# Patient Record
Sex: FEMALE | Race: WHITE
[De-identification: names, ages, dates, MRNs, and addresses within clinical notes are randomized per-mention and may not be internally consistent; named-entity substitution may affect disease eponyms.]

---

## 2021-01-20 ENCOUNTER — HOSPITAL ENCOUNTER (EMERGENCY)
Dept: HOSPITAL 47 - EC | Age: 21
Discharge: HOME | End: 2021-01-20
Payer: COMMERCIAL

## 2021-01-20 VITALS — DIASTOLIC BLOOD PRESSURE: 79 MMHG | HEART RATE: 89 BPM | RESPIRATION RATE: 16 BRPM | SYSTOLIC BLOOD PRESSURE: 136 MMHG

## 2021-01-20 VITALS — TEMPERATURE: 98.4 F
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
## 2021-01-20 DIAGNOSIS — Z79.84: ICD-10-CM

## 2021-01-20 DIAGNOSIS — Z79.899: ICD-10-CM

## 2021-01-20 DIAGNOSIS — S93.601A: Primary | ICD-10-CM

## 2021-01-20 PROCEDURE — 99283 EMERGENCY DEPT VISIT LOW MDM: CPT

## 2021-01-20 NOTE — ED
Lower Extremity Injury HPI





- General


Chief Complaint: Extremity Injury, Lower


Stated Complaint: Fall-Poss R leg broken


Time Seen by Provider: 01/20/21 14:07


Source: patient


Mode of arrival: wheelchair


Limitations: no limitations





- History of Present Illness


Initial Comments: 





20-year-old female presenting to emergency Department with chief complaint of a 

fall and right foot pain.  Patient reports occurred about half hour prior to 

arrival.  Patient states she tripped over a baby gate and had an inversion 

injury of the right ankle.  Patient reports most of the pain is located in the 

mid foot and some mild pain over the right lateral malleolus.  Denies any 

swelling, erythema or ecchymosis in the region.  States the pain is exacerbated 

with any movement and weightbearing.  Reports pain is alleviated at rest.  

Denies sick medication to alleviate the symptoms.  Denies any numbness or 

tingling.  States she is not able to move her toes due to pain.  Denies any head

injury or blood thinners.





- Related Data


                                Home Medications











 Medication  Instructions  Recorded  Confirmed


 


Dextroamphetamine/Amphetamine 20 mg PO BID 01/20/21 01/20/21





[Adderall]   


 


metFORMIN  mg PO BID 01/20/21 01/20/21











                                    Allergies











Allergy/AdvReac Type Severity Reaction Status Date / Time


 


No Known Allergies Allergy   Verified 01/20/21 15:47














Review of Systems


ROS Statement: 


Those systems with pertinent positive or pertinent negative responses have been 

documented in the HPI.





ROS Other: All systems not noted in ROS Statement are negative.





Past Medical History


Past Medical History: No Reported History


Past Surgical History: No Surgical Hx Reported





General Exam


Limitations: no limitations


General appearance: alert, in no apparent distress, obese


Head exam: Present: atraumatic, normocephalic, normal inspection


Eye exam: Present: normal appearance, PERRL, EOMI


Pupils: Present: normal accommodation


ENT exam: Present: normal exam, normal oropharynx, mucous membranes moist, TM's 

normal bilaterally, normal external ear exam


Neck exam: Present: normal inspection, full ROM.  Absent: tenderness


Respiratory exam: Present: normal lung sounds bilaterally.  Absent: respiratory 

distress, wheezes, rales


Cardiovascular Exam: Present: regular rate, normal rhythm, normal heart sounds. 

 Absent: systolic murmur, diastolic murmur


GI/Abdominal exam: Present: soft.  Absent: distended, tenderness, guarding


Extremities exam: Present: normal inspection, tenderness (Midfoot and lateral 

malleolus tenderness.), normal capillary refill, other (Palpable DP and PT 

bilaterally.).  Absent: full ROM (  Limited range of motion with plantar and 

dorsiflexion due to pain.), pedal edema, joint swelling


Back exam: Present: normal inspection, full ROM.  Absent: tenderness, CVA 

tenderness (R), CVA tenderness (L)


Neurological exam: Present: alert, oriented X3, normal gait


Psychiatric exam: Present: normal affect, normal mood


Skin exam: Present: warm, dry, intact, normal color





Course


                                   Vital Signs











  01/20/21 01/20/21





  14:02 16:12


 


Temperature 98.4 F 


 


Pulse Rate 102 H 89


 


Respiratory 18 16





Rate  


 


Blood Pressure 143/88 136/79


 


O2 Sat by Pulse 98 99





Oximetry  














Medical Decision Making





- Medical Decision Making





20-year-old female presenting to the emergency department with a chief complaint

 of foot pain.  On physical examination, patient is neurovascularly intact but 

she does have limited range of motion with plantar dorsiflexion due to pain.  

This was an inversion injury.  X-ray of the ankle and foot are negative for any 

acute processes.  Patient was given analgesia.  On Reevaluation, patient reports

 improvement in symptoms.  Patient was given a postop shoe.  Dr. Louie also 

examined the patient and is in agreement with the treatment plan.  She was 

advised to follow-up with orthopedic specialist.  Rest, ice, compression and 

elevation.  Return parameters discussed the patient was understanding and 

agreeable.





Disposition


Clinical Impression: 


 Sprain of right foot, Right foot injury





Disposition: HOME SELF-CARE


Condition: Stable


Instructions (If sedation given, give patient instructions):  Foot Sprain (ED)


Additional Instructions: 


Rest, ice, wear Ace bandage.  Follow-up with orthopedic specialist if symptoms 

not improved.  Take anti-inflammatory medication for pain control.


Is patient prescribed a controlled substance at d/c from ED?: No


Referrals: 


JIE Weems III, MD [Primary Care Provider] - 1-2 days


Time of Disposition: 16:04

## 2021-01-20 NOTE — XR
Right foot and right ankle

 

HISTORY: Trauma and pain

 

3 views of the right foot and 3 views the right ankle

 

Mild soft tissue swelling is noted. Bone mineralization, joint spaces and alignment are maintained.

 

IMPRESSION: No fracture or dislocation.

## 2021-09-10 ENCOUNTER — HOSPITAL ENCOUNTER (OUTPATIENT)
Dept: HOSPITAL 47 - FBPOP | Age: 21
End: 2021-09-10
Attending: OBSTETRICS & GYNECOLOGY
Payer: COMMERCIAL

## 2021-09-10 VITALS
HEART RATE: 107 BPM | DIASTOLIC BLOOD PRESSURE: 72 MMHG | RESPIRATION RATE: 16 BRPM | SYSTOLIC BLOOD PRESSURE: 141 MMHG | TEMPERATURE: 97.3 F

## 2021-09-10 DIAGNOSIS — Z3A.22: ICD-10-CM

## 2021-09-10 DIAGNOSIS — O36.8120: Primary | ICD-10-CM

## 2021-09-10 PROCEDURE — 99213 OFFICE O/P EST LOW 20 MIN: CPT

## 2021-10-24 NOTE — P.MSEPDOC
Presenting Problems





- Arrival Data


Date of Arrival on Unit: 09/10/21


Time of Arrival on Unit: 18:41


Mode of Transport: Ambulatory





- Complaint


OB-Reason for Admission/Chief Complaint: Decreased Fetal Movement





Prenatal Medical History





- Pregnancy Information


: 1


Para: 0


Term: 0


: 0


Abortions: Spontaneous or Elective: 0


Number of Living Children: 0





- Gestational Age


Gestational Age by MAGNOLIA (wks/days): 22 Weeks and 0 Days





Review of Systems





- Review of Systems


Constitutional: No problems


Breast: No problems


ENT: No problems


Cardiovascular: No problems


Respiratory: No problems


Gastrointestinal: No problems


Genitourinary: No problems


Musculoskeletal: No problems


Neurological: No problems


Skin: No problems, Rash





Vital Signs





- Temperature


Temperature: 97.3 F


Temperature Source: Temporal Artery Scan





- Pulse


  ** Pulse Oximetery


Pulse Rate: 107


Pulse Assessment Method: Pulse Oximetry





- Respirations


Respiratory Rate: 16


Oxygen Delivery Method: Room Air





- Blood Pressure


  ** Right Arm


Blood Pressure: 141/72


Blood Pressure Mean: 95


Blood Pressure Source: Automatic Cuff





- Comment


Vital Signs Comment: dr. fan aware of elevated blood pressure. BP reevaluated 

and documented.





Medical Screen Scoring





- Fetal Assessment - Baby A


Baseline FHR: 140





Physician Notification





- Physician Notified


Physician Notified Date: 09/10/21


Physician Notified Time: 19:04


Physician: Sonia Fan


New Order Received: Yes (d/c home)





Maternal Fetal Triage Index





- Prompt/Priority 3


Prompt Priority 3: Yes


Criteria Met for Priority 3: C/O DFM, elevated BP upon arrvial





Disposition





- Disposition


OB Disposition: Discharge to home, Written follow up instructions reviewed


Discharge Date: 09/10/21


Discharge Time: 19:17


I agree with the RN Medical Screening Exam: Yes


Case reviewed; plan agreed upon as documented in EMR&OBIX.: Yes


Comments: 





Patient was not seen or examined by myself


Diagnosis: DECREASED FETAL MOVEMENTS, SECOND TRIMESTER, FETUS 1

## 2021-11-05 ENCOUNTER — HOSPITAL ENCOUNTER (OUTPATIENT)
Dept: HOSPITAL 47 - FBPOP | Age: 21
Discharge: HOME | End: 2021-11-05
Attending: OBSTETRICS & GYNECOLOGY
Payer: COMMERCIAL

## 2021-11-05 VITALS
TEMPERATURE: 97.3 F | SYSTOLIC BLOOD PRESSURE: 135 MMHG | RESPIRATION RATE: 18 BRPM | HEART RATE: 114 BPM | DIASTOLIC BLOOD PRESSURE: 75 MMHG

## 2021-11-05 DIAGNOSIS — O99.283: Primary | ICD-10-CM

## 2021-11-05 DIAGNOSIS — E86.0: ICD-10-CM

## 2021-11-05 DIAGNOSIS — Z3A.30: ICD-10-CM

## 2021-11-05 LAB
HYALINE CASTS UR QL AUTO: 1 /LPF (ref 0–2)
PH UR: 6 [PH] (ref 5–8)
RBC UR QL: 8 /HPF (ref 0–5)
SP GR UR: 1.02 (ref 1–1.03)
SQUAMOUS UR QL AUTO: 7 /HPF (ref 0–4)
UROBILINOGEN UR QL STRIP: <2 MG/DL (ref ?–2)
WBC #/AREA URNS HPF: 6 /HPF (ref 0–5)

## 2021-11-05 PROCEDURE — 59025 FETAL NON-STRESS TEST: CPT

## 2021-11-05 PROCEDURE — 99214 OFFICE O/P EST MOD 30 MIN: CPT

## 2021-11-05 PROCEDURE — 87635 SARS-COV-2 COVID-19 AMP PRB: CPT

## 2021-11-05 PROCEDURE — 96375 TX/PRO/DX INJ NEW DRUG ADDON: CPT

## 2021-11-05 PROCEDURE — 96361 HYDRATE IV INFUSION ADD-ON: CPT

## 2021-11-05 PROCEDURE — 76805 OB US >/= 14 WKS SNGL FETUS: CPT

## 2021-11-05 PROCEDURE — 96365 THER/PROPH/DIAG IV INF INIT: CPT

## 2021-11-05 PROCEDURE — 81001 URINALYSIS AUTO W/SCOPE: CPT

## 2021-11-05 RX ADMIN — POTASSIUM CHLORIDE SCH MLS/HR: 14.9 INJECTION, SOLUTION INTRAVENOUS at 09:16

## 2021-11-05 RX ADMIN — POTASSIUM CHLORIDE SCH MLS/HR: 14.9 INJECTION, SOLUTION INTRAVENOUS at 08:15

## 2021-11-05 NOTE — US
EXAMINATION TYPE: US OB >= 14 wk fetus

 

DATE OF EXAM: 11/5/2021

 

COMPARISON: None

 

CLINICAL HISTORY: pregnancy complicationsPatient states she has been measuring large for dates. 

 

TECHNIQUE:  Transvaginal (TV) and Transabdominal (TA)

 

GESTATIONAL AGE / DATING

Physician Established: (30 weeks/0 days) 

** EDC:  1/14/2022

Dates by LMP: LMP unknown  

Dates by First Scan: No previous here  

Dates by Current Scan: (32 weeks/2 days) 

** EDC: 12/29/2021 

 

 

FETAL SURVEY

IUP:  Single

PLACENTA: Anterior     

PREVIA:  No Previa

** BOONE: 16.3 cm Normal

CERVICAL LENGTH (transvaginal: norm> 2.5cm):  4.1 cm

(Supplemental transvaginal imaging performed to verify cervical length.)

 

FETAL BIOMETRY

PRESENTATION:  Vertex

FETAL LIE:  Longitudinal

BPD: 8.7 cm

**  35 weeks / 1 days

HC: 30.4 cm

**  33 weeks / 6 days

AC: 29.1 cm

**  33 weeks / 1 days

FL: 5.9 cm

**  30 weeks / 4 days

ESTIMATED FETAL WEIGHT IN GRAMS:  2014 grams

ESTIMATED FETAL WEIGHT IN LBS/OZ:  4 lbs. 7 oz. 

WEIGHT PERCENTAGE BASED ON ESTABLISHED DATES:   97%

HC/AC: 1.0 Normal

FL/AC: 20.1 Normal 

HEART RATE:  151 bpm 

RHYTHM:  Normal

 

 

IUP measuring 32w2d with  

 

 

 

IMPRESSION:

 

1. Single intrauterine gestation estimated at 32 weeks 2 days gestation based on current ultrasound m
easurements. Cardiac activity measures 151 bpm.

2. Femur length to biparietal diameter is below the mean. Femur length to head circumference is below
 the mean. Estimated fetal weight is greater than 97th percentile currently measuring 2014 grams

## 2021-11-14 NOTE — P.MSEPDOC
Presenting Problems





- Arrival Data


Date of Arrival on Unit: 21


Time of Arrival on Unit: 07:09


Mode of Transport: Ambulatory





- Complaint


OB-Reason for Admission/Chief Complaint: Pain


Comment: 30 weeks ga, n/v, headache, pelvic pain





Prenatal Medical History





- Pregnancy Information


: 1


Para: 0


Term: 0


: 0


Abortions: Spontaneous or Elective: 0


Number of Living Children: 0





- Gestational Age


Gestational Age by MAGNOLIA (wks/days): 30 Weeks and 0 Days





Review of Systems





- Review of Systems


Constitutional: No problems


Breast: No problems


ENT: No problems


Cardiovascular: No problems


Respiratory: No problems


Gastrointestinal: No problems


Genitourinary: No problems


Musculoskeletal: No problems


Neurological: No problems


Skin: No problems





Vital Signs





- Temperature


Temperature: 97.3 F


Temperature Source: Temporal Artery Scan





- Pulse


  ** Right Brachial


Pulse Rate: 114


Pulse Assessment Method: Automatic Cuff





- Respirations


Respiratory Rate: 18


Oxygen Delivery Method: Room Air





- Blood Pressure


  ** Right Arm


Blood Pressure: 135/75


Blood Pressure Mean: 95


Blood Pressure Source: Automatic Cuff





Medical Screen Scoring





- Uterine Contractions


Frequency From (mins): 0





- Fetal Assessment - Baby A


Baseline FHR: 140


Fetal Heart Rate - NICHD Category: Category I (Normal)


NST: Reactive





Physician Notification





- Physician Notified


Physician Notified Date: 21


Physician Notified Time: 09:40


Physician: Sonia Fan


New Order Received: No





- Notification Comment


Comment: n/v, visual changes, pelvic pressure, back pain, reactive nst, no 

contractions, IVF given, ua and covid test obtained, covid negative, zofran and 

ofirmive given,ultrasound obtained, pt feeling better, instructed to increase 

oral fluids and given keflex prescription





Maternal Fetal Triage Index





- Maternal Fetal Triage Index


Presenting for scheduled procedure w/no complaint: No





- Stat/Priority 1


Stat Priority 1: No





- Urgent/Priority 2


Urgent Priority 2: Yes


Provider Notified: Sonia Fan


Provider Notified Time: 07:46


Criteria Met for Priority 2: 30 weeks ga, n/v, headache, pelvic pressure





Disposition





- Disposition


OB Disposition: Triage, Discharge to home, Written follow up instructions 

reviewed


Discharge Date: 21


Discharge Time: 09:50


I agree with the RN Medical Screening Exam: Yes


Physician's MSE Comment: 





Patient was not seen or examined by myself


Case reviewed; plan agreed upon as documented in EMR&OBIX.: Yes


Diagnosis: DEHYDRATION

## 2021-11-22 ENCOUNTER — HOSPITAL ENCOUNTER (OUTPATIENT)
Dept: HOSPITAL 47 - FBPOP | Age: 21
Discharge: HOME | End: 2021-11-22
Attending: OBSTETRICS & GYNECOLOGY
Payer: COMMERCIAL

## 2021-11-22 VITALS
DIASTOLIC BLOOD PRESSURE: 75 MMHG | HEART RATE: 109 BPM | RESPIRATION RATE: 17 BRPM | SYSTOLIC BLOOD PRESSURE: 129 MMHG | TEMPERATURE: 96.6 F

## 2021-11-22 DIAGNOSIS — Z3A.32: ICD-10-CM

## 2021-11-22 DIAGNOSIS — O13.3: Primary | ICD-10-CM

## 2021-11-22 LAB
ALT SERPL-CCNC: 19 U/L (ref 4–34)
AST SERPL-CCNC: 29 U/L (ref 14–36)
BASOPHILS # BLD AUTO: 0 K/UL (ref 0–0.2)
BASOPHILS NFR BLD AUTO: 0 %
BUN SERPL-SCNC: 6 MG/DL (ref 7–17)
EOSINOPHIL # BLD AUTO: 0.1 K/UL (ref 0–0.7)
EOSINOPHIL NFR BLD AUTO: 1 %
ERYTHROCYTE [DISTWIDTH] IN BLOOD BY AUTOMATED COUNT: 4.53 M/UL (ref 3.8–5.4)
ERYTHROCYTE [DISTWIDTH] IN BLOOD: 15.3 % (ref 11.5–15.5)
HCT VFR BLD AUTO: 37.3 % (ref 34–46)
HGB BLD-MCNC: 12.8 GM/DL (ref 11.4–16)
LDH SPEC-CCNC: 348 U/L (ref 313–618)
LYMPHOCYTES # SPEC AUTO: 2.4 K/UL (ref 1–4.8)
LYMPHOCYTES NFR SPEC AUTO: 17 %
MCH RBC QN AUTO: 28.2 PG (ref 25–35)
MCHC RBC AUTO-ENTMCNC: 34.3 G/DL (ref 31–37)
MCV RBC AUTO: 82.2 FL (ref 80–100)
MONOCYTES # BLD AUTO: 0.5 K/UL (ref 0–1)
MONOCYTES NFR BLD AUTO: 4 %
NEUTROPHILS # BLD AUTO: 11 K/UL (ref 1.3–7.7)
NEUTROPHILS NFR BLD AUTO: 77 %
PH UR: 6 [PH] (ref 5–8)
PLATELET # BLD AUTO: 281 K/UL (ref 150–450)
PROT/CREAT UR-RTO: 0.04
SP GR UR: 1.02 (ref 1–1.03)
URATE SERPL-MCNC: 3.2 MG/DL (ref 3.7–7.4)
UROBILINOGEN UR QL STRIP: <2 MG/DL (ref ?–2)
WBC # BLD AUTO: 14.4 K/UL (ref 3.8–10.6)

## 2021-11-22 PROCEDURE — 81003 URINALYSIS AUTO W/O SCOPE: CPT

## 2021-11-22 PROCEDURE — 82565 ASSAY OF CREATININE: CPT

## 2021-11-22 PROCEDURE — 84520 ASSAY OF UREA NITROGEN: CPT

## 2021-11-22 PROCEDURE — 84450 TRANSFERASE (AST) (SGOT): CPT

## 2021-11-22 PROCEDURE — 82570 ASSAY OF URINE CREATININE: CPT

## 2021-11-22 PROCEDURE — 85025 COMPLETE CBC W/AUTO DIFF WBC: CPT

## 2021-11-22 PROCEDURE — 84156 ASSAY OF PROTEIN URINE: CPT

## 2021-11-22 PROCEDURE — 84460 ALANINE AMINO (ALT) (SGPT): CPT

## 2021-11-22 PROCEDURE — 84550 ASSAY OF BLOOD/URIC ACID: CPT

## 2021-11-22 PROCEDURE — 59025 FETAL NON-STRESS TEST: CPT

## 2021-11-22 PROCEDURE — 83615 LACTATE (LD) (LDH) ENZYME: CPT

## 2021-12-10 ENCOUNTER — HOSPITAL ENCOUNTER (INPATIENT)
Dept: HOSPITAL 47 - FBPOP | Age: 21
LOS: 5 days | Discharge: HOME | End: 2021-12-15
Attending: OBSTETRICS & GYNECOLOGY | Admitting: OBSTETRICS & GYNECOLOGY
Payer: COMMERCIAL

## 2021-12-10 DIAGNOSIS — E66.01: ICD-10-CM

## 2021-12-10 DIAGNOSIS — O36.63X0: ICD-10-CM

## 2021-12-10 DIAGNOSIS — Z3A.35: ICD-10-CM

## 2021-12-10 LAB
BASOPHILS # BLD AUTO: 0 K/UL (ref 0–0.2)
BASOPHILS NFR BLD AUTO: 0 %
EOSINOPHIL # BLD AUTO: 0.1 K/UL (ref 0–0.7)
EOSINOPHIL NFR BLD AUTO: 1 %
ERYTHROCYTE [DISTWIDTH] IN BLOOD BY AUTOMATED COUNT: 4.41 M/UL (ref 3.8–5.4)
ERYTHROCYTE [DISTWIDTH] IN BLOOD: 15.7 % (ref 11.5–15.5)
GLUCOSE BLD-MCNC: 119 MG/DL (ref 75–99)
GLUCOSE BLD-MCNC: 89 MG/DL (ref 75–99)
GLUCOSE UR QL: (no result)
HCT VFR BLD AUTO: 37.5 % (ref 34–46)
HGB BLD-MCNC: 12.2 GM/DL (ref 11.4–16)
HYALINE CASTS UR QL AUTO: 1 /LPF (ref 0–2)
KETONES UR QL STRIP.AUTO: (no result)
LYMPHOCYTES # SPEC AUTO: 2.5 K/UL (ref 1–4.8)
LYMPHOCYTES NFR SPEC AUTO: 16 %
MCH RBC QN AUTO: 27.7 PG (ref 25–35)
MCHC RBC AUTO-ENTMCNC: 32.7 G/DL (ref 31–37)
MCV RBC AUTO: 84.9 FL (ref 80–100)
MONOCYTES # BLD AUTO: 0.7 K/UL (ref 0–1)
MONOCYTES NFR BLD AUTO: 5 %
NEUTROPHILS # BLD AUTO: 11.8 K/UL (ref 1.3–7.7)
NEUTROPHILS NFR BLD AUTO: 77 %
PH UR: 6 [PH] (ref 5–8)
PLATELET # BLD AUTO: 251 K/UL (ref 150–450)
PROT UR QL: (no result)
RBC UR QL: 2 /HPF (ref 0–5)
SP GR UR: 1.02 (ref 1–1.03)
SQUAMOUS UR QL AUTO: 3 /HPF (ref 0–4)
UROBILINOGEN UR QL STRIP: <2 MG/DL (ref ?–2)
WBC # BLD AUTO: 15.4 K/UL (ref 3.8–10.6)
WBC # UR AUTO: 4 /HPF (ref 0–5)

## 2021-12-10 PROCEDURE — 96360 HYDRATION IV INFUSION INIT: CPT

## 2021-12-10 PROCEDURE — 86900 BLOOD TYPING SEROLOGIC ABO: CPT

## 2021-12-10 PROCEDURE — 81001 URINALYSIS AUTO W/SCOPE: CPT

## 2021-12-10 PROCEDURE — 83036 HEMOGLOBIN GLYCOSYLATED A1C: CPT

## 2021-12-10 PROCEDURE — 86901 BLOOD TYPING SEROLOGIC RH(D): CPT

## 2021-12-10 PROCEDURE — 88307 TISSUE EXAM BY PATHOLOGIST: CPT

## 2021-12-10 PROCEDURE — 96361 HYDRATE IV INFUSION ADD-ON: CPT

## 2021-12-10 PROCEDURE — 87086 URINE CULTURE/COLONY COUNT: CPT

## 2021-12-10 PROCEDURE — 76815 OB US LIMITED FETUS(S): CPT

## 2021-12-10 PROCEDURE — 84112 EVAL AMNIOTIC FLUID PROTEIN: CPT

## 2021-12-10 PROCEDURE — 85025 COMPLETE CBC W/AUTO DIFF WBC: CPT

## 2021-12-10 PROCEDURE — 99214 OFFICE O/P EST MOD 30 MIN: CPT

## 2021-12-10 PROCEDURE — 86850 RBC ANTIBODY SCREEN: CPT

## 2021-12-10 PROCEDURE — 59025 FETAL NON-STRESS TEST: CPT

## 2021-12-10 RX ADMIN — MAGNESIUM SULFATE IN WATER SCH MLS/HR: 40 INJECTION, SOLUTION INTRAVENOUS at 17:40

## 2021-12-10 RX ADMIN — POTASSIUM CHLORIDE SCH MLS/HR: 14.9 INJECTION, SOLUTION INTRAVENOUS at 16:32

## 2021-12-10 RX ADMIN — POTASSIUM CHLORIDE SCH MLS/HR: 14.9 INJECTION, SOLUTION INTRAVENOUS at 15:15

## 2021-12-10 NOTE — P.PN
Subjective


Progress Note Date: 12/10/21


Principal diagnosis: 





35-0/7 weeks, gestational diabetes,  labor





The patient continues to have significantly uncomfortable contractions on a 

regular basis.  She initially had relief with a bolus of magnesium sulfate but 

has continued to contract to make cervical change.





Objective





- Vital Signs


Vital signs: 


                                 Intake & Output











 12/10/21 12/10/21 12/11/21





 06:59 18:59 06:59


 


Weight  126.099 kg 














- Exam





Fetal status remains category 1 with no evidence of decelerations.  Contractions

continued to be present every 4-8 minutes with significant patient discomfort.  

Examination by the nursing staff demonstrates her cervix to be 4 centimeters 

dilated demonstrating appreciable change from earlier check of 2 on initial 

check and 3 at initiation of magnesium sulfate.





- Labs


CBC & Chem 7: 


                                 12/10/21 15:15





Labs: 


                  Abnormal Lab Results - Last 24 Hours (Table)











  12/10/21 12/10/21 12/10/21 Range/Units





  15:15 15:20 16:39 


 


WBC  15.4 H    (3.8-10.6)  k/uL


 


RDW  15.7 H    (11.5-15.5)  %


 


Neutrophils #  11.8 H    (1.3-7.7)  k/uL


 


POC Glucose (mg/dL)    119 H  (75-99)  mg/dL


 


Urine Appearance   Cloudy H   (Clear)  


 


Urine Protein   1+ H   (Negative)  


 


Urine Glucose (UA)   3+ H   (Negative)  


 


Urine Ketones   1+ H   (Negative)  


 


Ur Leukocyte Esterase   Trace H   (Negative)  


 


Urine Bacteria   Rare H   (None)  /hpf


 


Urine Mucus   Many H   (None)  /hpf














Assessment and Plan


(1) Gestational diabetes


Current Visit: Yes   Status: Acute   Code(s): O24.419 - GESTATIONAL DIABETES 

MELLITUS IN PREGNANCY, UNSP CONTROL   SNOMED Code(s): 63917655


   





(2)  labor


Current Visit: Yes   Status: Acute   Code(s): O60.00 -  LABOR WITHOUT 

DELIVERY, UNSPECIFIED TRIMESTER   SNOMED Code(s): 9149404


   





(3) 35 weeks gestation of pregnancy


Current Visit: Yes   Status: Acute   Code(s): Z3A.35 - 35 WEEKS GESTATION OF 

PREGNANCY   SNOMED Code(s): 47515273


   


Plan: 





The patient is unfortunately breaking through tocolytic therapy.  As her 

estimated fetal weight within the last week was already demonstrated at 3990 g 

and the plan had been made with her primary obstetrician for elective primary 

low-transverse  section, the patient was counseled and has agreed to 

undergo primary low-transverse  section for the reasons as outlined 

above.  She has received 1 dose of betamethasone though only approximately 5-6 

hours ago.  She has been counseled regarding the issues of potential 

prematurity, especially in the face of probable poorly controlled diabetes as 

well as the possibility of fetal transfer to tertiary care institution.

## 2021-12-10 NOTE — US
EXAMINATION TYPE: US OB limited

 

DATE OF EXAM: 12/10/2021

 

COMPARISON: NONE

 

CLINICAL HISTORY: pre term labor . For fetal position only

 

EXAM PERFORMED:  Transabdominal (TA)

 

GESTATIONAL AGE / DATING

Physician Established: (35 weeks/0 days)

** EDC: 01/14/2022

** No growth performed on today?s study per ordering physician 

 

FETAL SURVEY

 

CERVICAL LENGTH (transabdominal: norm > 3.0cm):  3.4 cm

 

(PRESENTATION: Vertex

FETAL LIE:  Longitudinal

 

HEART RATE:  157 bpm

RHYTHM:  Normal

 

Tech findings reported to patient's RN, Shania, at exam's end. 

 

 

 

IMPRESSION: Cervix is closed. There is cephalic presentation.

## 2021-12-10 NOTE — P.HPOB
History of Present Illness


H&P Date: 12/10/21


Chief Complaint: 35-0/7 weeks,  labor





The patient is a 21-year-old  1 para 0 admitted at 35-0/7 weeks as 

established by early ultrasound.  Her pregnancy has been complicated by a very 

recent diagnosis of gestational diabetes found at 34 weeks at which time she 

also underwent ultrasound for large for gestational age and was found with the 

fetus at greater than 99th percentile, estimated fetal weight of 3990 g.  As a 

result, a plan has been made for elective primary low-transverse  

section at approximately 39 weeks of gestation.  She presents to labor and 

delivery today complaining of contractions and possible rupture of membranes.  

Rupture of membranes has been ruled out.  She was found to be magdalene fairly

regularly every 2-4 minutes and has made appreciable cervical change while on 

labor and delivery from approximately 2 to 3 cm.  IV hydration as failed to slow

the contractions and, as a result, she will be admitted for magnesium sulfate 

tocolysis.  Betamethasone is being given currently.  Should she continue to 

labor, antibiotic prophylaxis will be started.





Obstetrical history:  1 para 0 with current pregnancy statistics listed 

in history present illness.  EDC of 2022 was established by early 

ultrasound.  Laboratory workup demonstrates a blood type of O+ with a negative 

antibody screen.  Rubella status is immune.  The remainder of the laboratory 

workup was within normal limits.  Early Glucola was elevated but apparently no 

follow-up 3 hour glucose tolerance test was performed.  Second trimester glucose

tolerance test was again elevated with a significantly elevated three-hour 

glucose tolerance test.  Group B strep status has not yet been performed.





Gynecologic history: Unremarkable with no history of any infections to include 

STDs.





Review of Systems





Review of systems is confined to history of present illness.





Past Medical History


Past Medical History: No Reported History


History of Any Multi-Drug Resistant Organisms: None Reported


Past Surgical History: No Surgical Hx Reported


Smoking Status: Never smoker





Medications and Allergies


                                Home Medications











 Medication  Instructions  Recorded  Confirmed  Type


 


Pnv No.95/Ferrous Fum/Folic AC 1 tablet PO DAILY 09/10/21 12/10/21 History





[Prenatal Multivitamin Tablet]    


 


Famotidine [Pepcid] 10 mg PO AS DIRECTED 11/22/21 12/10/21 History








                                    Allergies











Allergy/AdvReac Type Severity Reaction Status Date / Time


 


No Known Allergies Allergy   Verified 12/10/21 14:36














Exam


                                Intake and Output











 12/10/21 12/10/21 12/10/21





 06:59 14:59 22:59


 


Other:   


 


  Weight  126.099 kg 














In general, this is a morbidly obese white female in no acute distress though 

she is uncomfortable with contractions.  Her heart has a regular rhythm and rate

 without murmur.  Her lungs are clear to auscultation bilaterally in all fields.

  Her abdomen is obese, nondistended, has normal active bowel sounds, soft, 

nontender, without any palpable masses aside from uterine fundus.  Her 

extremities without any cyanosis, clubbing, or significant edema and are 

nontender to palpation bilaterally.  Digital cervical examination performed by 

the nursing staff has most recently demonstrated her cervix to be 3 cm dilated, 

70% effaced, with the vertex in presentation at -2 station.  Vertex presentation

 was confirmed by bedside ultrasound.





Results


                  Abnormal Lab Results - Last 24 Hours (Table)











  12/10/21 12/10/21 Range/Units





  15:20 16:39 


 


POC Glucose (mg/dL)   119 H  (75-99)  mg/dL


 


Urine Appearance  Cloudy H   (Clear)  


 


Urine Protein  1+ H   (Negative)  


 


Urine Glucose (UA)  3+ H   (Negative)  


 


Urine Ketones  1+ H   (Negative)  


 


Ur Leukocyte Esterase  Trace H   (Negative)  


 


Urine Bacteria  Rare H   (None)  /hpf


 


Urine Mucus  Many H   (None)  /hpf














Assessment and Plan


(1) Gestational diabetes


Current Visit: Yes   Status: Acute   Code(s): O24.419 - GESTATIONAL DIABETES 

MELLITUS IN PREGNANCY, UNSP CONTROL   SNOMED Code(s): 53469755


   





(2)  labor


Current Visit: Yes   Status: Acute   Code(s): O60.00 -  LABOR WITHOUT 

DELIVERY, UNSPECIFIED TRIMESTER   SNOMED Code(s): 5208514


   





(3) 35 weeks gestation of pregnancy


Current Visit: Yes   Status: Acute   Code(s): Z3A.35 - 35 WEEKS GESTATION OF 

PREGNANCY   SNOMED Code(s): 99892701


   


Plan: 





The patient is admitted for treatment with magnesium sulfate to attempt to stop 

labor.  It Methasone is being given and will be repeated in 12 hours if the 

patient remains pregnant.  Should she continue to labor, antibiotic prophylaxis 

will be added.  I will have a discussion with the patient regarding mode of de

livery as a previous discussion with her primary obstetrician has made a plan 

for  delivery.  She will have close maternal fetal surveillance and 

expectant management will continue to be practiced.  We will follow ins and outs

 carefully.  Given her gestational diabetes and possibly pre-existing diabetes, 

I will additionally consult internal medicine for management of sugars should 

she remain pregnant and stable.

## 2021-12-11 LAB
GLUCOSE BLD-MCNC: 112 MG/DL (ref 75–99)
GLUCOSE BLD-MCNC: 121 MG/DL (ref 75–99)
GLUCOSE BLD-MCNC: 125 MG/DL (ref 75–99)

## 2021-12-11 PROCEDURE — 4A0HXCZ MEASUREMENT OF PRODUCTS OF CONCEPTION, CARDIAC RATE, EXTERNAL APPROACH: ICD-10-PCS

## 2021-12-11 RX ADMIN — ACETAMINOPHEN SCH MG: 500 TABLET ORAL at 16:34

## 2021-12-11 RX ADMIN — DEXTROSE SCH MLS/HR: 50 INJECTION, SOLUTION INTRAVENOUS at 07:55

## 2021-12-11 RX ADMIN — MAGNESIUM SULFATE IN WATER SCH MLS/HR: 40 INJECTION, SOLUTION INTRAVENOUS at 03:48

## 2021-12-11 RX ADMIN — BUTORPHANOL TARTRATE PRN MG: 1 INJECTION, SOLUTION INTRAMUSCULAR; INTRAVENOUS at 01:17

## 2021-12-11 RX ADMIN — POTASSIUM CHLORIDE SCH MLS/HR: 14.9 INJECTION, SOLUTION INTRAVENOUS at 03:49

## 2021-12-11 RX ADMIN — POTASSIUM CHLORIDE SCH: 14.9 INJECTION, SOLUTION INTRAVENOUS at 17:33

## 2021-12-11 RX ADMIN — DOCUSATE SODIUM AND SENNOSIDES SCH EACH: 50; 8.6 TABLET ORAL at 22:52

## 2021-12-11 RX ADMIN — ACETAMINOPHEN SCH MG: 500 TABLET ORAL at 22:15

## 2021-12-11 RX ADMIN — BUTORPHANOL TARTRATE PRN MG: 1 INJECTION, SOLUTION INTRAMUSCULAR; INTRAVENOUS at 07:04

## 2021-12-11 RX ADMIN — DEXTROSE SCH MLS/HR: 50 INJECTION, SOLUTION INTRAVENOUS at 04:01

## 2021-12-11 RX ADMIN — POTASSIUM CHLORIDE SCH MLS/HR: 14.9 INJECTION, SOLUTION INTRAVENOUS at 13:32

## 2021-12-11 RX ADMIN — INSULIN ASPART SCH: 100 INJECTION, SOLUTION INTRAVENOUS; SUBCUTANEOUS at 01:11

## 2021-12-11 RX ADMIN — IBUPROFEN SCH: 600 TABLET ORAL at 19:16

## 2021-12-11 RX ADMIN — INSULIN ASPART SCH: 100 INJECTION, SOLUTION INTRAVENOUS; SUBCUTANEOUS at 03:19

## 2021-12-11 RX ADMIN — INSULIN ASPART SCH: 100 INJECTION, SOLUTION INTRAVENOUS; SUBCUTANEOUS at 17:34

## 2021-12-11 RX ADMIN — POTASSIUM CHLORIDE SCH: 14.9 INJECTION, SOLUTION INTRAVENOUS at 19:16

## 2021-12-11 RX ADMIN — BUTORPHANOL TARTRATE PRN MG: 1 INJECTION, SOLUTION INTRAMUSCULAR; INTRAVENOUS at 02:59

## 2021-12-11 NOTE — P.OP
Date of Procedure: 21


Preoperative Diagnosis: 


#1.  35 and one sevenths weeks,  labor, failed tocolysis #2.  Probable 

poorly controlled gestational diabetes #3.  Fetal macrosomia, estimated fetal 

weight approximate 4000 g #4.  Morbid obesity


Postoperative Diagnosis: 


Same


Procedure(s) Performed: 


#1.  Elective primary low-transverse  section


Anesthesia: spinal


Surgeon: Daniel Min


Estimated Blood Loss (ml): 725


IV fluids (ml): 1,000


Urine output (ml): 200


Pathology: other (Placenta)


Condition: stable


Disposition: floor


Operative Findings: 


See previously dictated notes for conditions leading to  section.  The 

patient was taken the operating room where she was delivered of a viable 8 lbs. 

5 oz. baby girl with Apgars of 8 at 1 minute and 9 at 5 minutes.  The placenta 

was delivered manually, intact, and grossly normal with a grossly normal three-

vessel cord.  Uterus, tubes, and ovaries were entirely normal to inspection.


Description of Procedure: 


The patient was prepped and draped in usual fashion after spinal anesthesia was 

Mester by the anesthesiologist.  A Pfannenstiel incision was made and extended 

into the abdominal cavity without difficulty.  The bladder peritoneum was 

elevated, incised, and reflected distally.  A 2 cm incision was made in the 

transverse plane of the lower uterine segment to enter the uterus at which time 

copious amounts of clear fluid were noted.  Incision was extended in both 

directions using the bandage scissors.  The fetal head was encountered deep 

within the pelvis and was delivered up and through the incision where the nose 

and mouth were thoroughly suctioned.  The remainder of the infant was delivered 

onto the field where the was doubly clamped, cut, and the infant passed 

resuscitative measures with weight and Apgars as noted above.  A segment of cord

was doubly clamped, cut, and set aside should cord gases become necessary after 

collecting fetal cord blood.  The placenta was delivered manually and intact as 

noted above.  The uterus was exteriorized and the interior cavity of uterus 

swept of any remaining placental or membranous fragments.  The margins of the 

incision were grasped with Sierra clamps and the incision was noted to have 

extended slightly on the right side but not to the level of the vessels.  The 

incision was closed in 2 layers with the first layer being a running locking 

stitch of 0 chromic catgut followed by a running imbricating stitch of 0 chromic

catgut, each from margin to margin.  The incision was reexamined and thought to 

be hemostatic.  The posterior cul-de-sac was suctioned with a guard followed by 

laparotomy sponge.  The uterus was replaced within the abdominal cavity and the 

gutters swept of any remaining blood, fluid, or clot.  Examination of the 

incision demonstrated small points of bleeding which were made hemostatic with 

the Bovie and one larger point of bleeding in the midsection made hemostatic 

with a figure-of-eight stitch of 0 chromic catgut.  After ensuring hemostasis, 

the parietal peritoneum was reapproximated loosely in the layer of muscles 

examined and found to be hemostatic.  The fascia was closed with 2 running 

stitches of 0 Vicryl proceeding from the lateral margins to the midpoint.  

Subcutaneous tissues were irrigated, found to be hemostatic, and reapproximated 

with a running stitch of 30 plain catgut.  Skin was approximated of the running 

subcuticular stitch of 4-0 Vicryl followed by half-inch Steri-Strips placed with

Mastisol.  Quantitative blood loss for the case was 724 mL.  All sponge, 

instrument, and needle counts were correct.  There were no compilations.  The 

patient tolerated the procedure well and proceeded to the recovery room in 

stable condition.  Both mother and infant are resting comfortably in recovery at

this time.

## 2021-12-11 NOTE — P.PN
Progress Note - Text


Progress Note Date: 21





Last evening when the decision was made to proceed with  section, 

emergency surgery was called in the main operating room delaying the start of 

 delivery for this patient.  In the interim time of waiting to begin, 

the patient's contraction pattern significantly spaced out though she did remain

uncomfortable and she had contractions.  Her cervix was rechecked and she had 

made no appreciable change.  As a result, the decision was made to delay 

 section until either the onset of active labor or 24 hours post 

steroid's.  She did receive a second dose of steroids last night at 0500.  She 

continues to have contractions which have again become a little bit closer and a

recheck of her cervix this morning demonstrated her cervix to be 4-5 cm putting 

her into the active phase of labor.  As a result, we will now proceed with 

elective primary low-transverse  section having maximized the time for 

steroids to affect the post birth management of the infant.  This has been 

discussed at length with the patient and her significant other.  At this time, 

we continue to wait for the availability of anesthesia to proceed with the case.

## 2021-12-12 LAB
BASOPHILS # BLD AUTO: 0 K/UL (ref 0–0.2)
BASOPHILS NFR BLD AUTO: 0 %
EOSINOPHIL # BLD AUTO: 0 K/UL (ref 0–0.7)
EOSINOPHIL NFR BLD AUTO: 0 %
ERYTHROCYTE [DISTWIDTH] IN BLOOD BY AUTOMATED COUNT: 3.58 M/UL (ref 3.8–5.4)
ERYTHROCYTE [DISTWIDTH] IN BLOOD: 15.5 % (ref 11.5–15.5)
HCT VFR BLD AUTO: 30.2 % (ref 34–46)
HGB BLD-MCNC: 9.8 GM/DL (ref 11.4–16)
LYMPHOCYTES # SPEC AUTO: 2.9 K/UL (ref 1–4.8)
LYMPHOCYTES NFR SPEC AUTO: 17 %
MCH RBC QN AUTO: 27.4 PG (ref 25–35)
MCHC RBC AUTO-ENTMCNC: 32.5 G/DL (ref 31–37)
MCV RBC AUTO: 84.3 FL (ref 80–100)
MONOCYTES # BLD AUTO: 0.9 K/UL (ref 0–1)
MONOCYTES NFR BLD AUTO: 5 %
NEUTROPHILS # BLD AUTO: 13.4 K/UL (ref 1.3–7.7)
NEUTROPHILS NFR BLD AUTO: 76 %
PLATELET # BLD AUTO: 232 K/UL (ref 150–450)
WBC # BLD AUTO: 17.6 K/UL (ref 3.8–10.6)

## 2021-12-12 RX ADMIN — POTASSIUM CHLORIDE SCH: 14.9 INJECTION, SOLUTION INTRAVENOUS at 05:02

## 2021-12-12 RX ADMIN — IBUPROFEN SCH: 600 TABLET ORAL at 08:28

## 2021-12-12 RX ADMIN — DOCUSATE SODIUM AND SENNOSIDES SCH EACH: 50; 8.6 TABLET ORAL at 15:18

## 2021-12-12 RX ADMIN — IBUPROFEN SCH MG: 600 TABLET ORAL at 12:53

## 2021-12-12 RX ADMIN — ACETAMINOPHEN SCH MG: 500 TABLET ORAL at 16:11

## 2021-12-12 RX ADMIN — IBUPROFEN SCH: 600 TABLET ORAL at 18:17

## 2021-12-12 RX ADMIN — ACETAMINOPHEN SCH: 500 TABLET ORAL at 08:27

## 2021-12-12 RX ADMIN — ACETAMINOPHEN SCH MG: 500 TABLET ORAL at 10:25

## 2021-12-12 RX ADMIN — IBUPROFEN SCH: 600 TABLET ORAL at 05:01

## 2021-12-12 RX ADMIN — DOCUSATE SODIUM AND SENNOSIDES SCH EACH: 50; 8.6 TABLET ORAL at 23:00

## 2021-12-12 RX ADMIN — DOCUSATE SODIUM AND SENNOSIDES SCH: 50; 8.6 TABLET ORAL at 08:28

## 2021-12-12 RX ADMIN — IBUPROFEN SCH MG: 600 TABLET ORAL at 19:13

## 2021-12-12 NOTE — P.PNOBGPC
Subjective





- Subjective


Patient reports: Reports appetite normal, Reports voiding normally, Reports pain

well controlled, Reports ambulating normally


Sunset: doing well, in NICU (Being treated for issues of prematurity.)





Objective





- Vital Signs


Latest vital signs: 


                                   Vital Signs











  Temp Pulse Resp BP Pulse Ox


 


 21 08:00  98.2 F  88  16  129/80 


 


 21 04:00  98.4 F  90  16  132/74  99


 


 21 00:00  98.3 F  91  15  110/63  95


 


 21 20:00  98.3 F  100  16  129/72  98


 


 21 15:22  99.1 F  101 H  16  127/65  97


 


 21 13:14  98.7 F  98  16  116/69  100








                                Intake and Output











 21





 22:59 06:59 14:59


 


Intake Total 960  


 


Output Total 650 500 


 


Balance 310 -500 


 


Intake:   


 


  Oral 960  


 


Output:   


 


  Urine 650 500 


 


    Uretheral (Sidhu) 100  


 


Other:   


 


  Voiding Method Indwelling Catheter Indwelling Catheter 


 


  # Voids  1 1














- Exam


Extremities: Present: normal, edema (1-2+ bilateral lower extremity edema 

present.)


Abdomen: Present: normal appearance, soft.  Absent: distention, tenderness


Incision: Present: normal, dry, intact


Uterus: Present: normal, firm





- Labs


Labs: 


                  Abnormal Lab Results - Last 24 Hours (Table)











  21 Range/Units





  05:00 


 


WBC  17.6 H  (3.8-10.6)  k/uL


 


RBC  3.58 L  (3.80-5.40)  m/uL


 


Hgb  9.8 L D  (11.4-16.0)  gm/dL


 


Hct  30.2 L  (34.0-46.0)  %


 


Neutrophils #  13.4 H  (1.3-7.7)  k/uL














Assessment and Plan


(1) Gestational diabetes


Current Visit: Yes   Status: Acute   Code(s): O24.419 - GESTATIONAL DIABETES 

MELLITUS IN PREGNANCY, UNSP CONTROL   SNOMED Code(s): 19512100


   





(2)  labor


Current Visit: Yes   Status: Acute   Code(s): O60.00 -  LABOR WITHOUT 

DELIVERY, UNSPECIFIED TRIMESTER   SNOMED Code(s): 6659546


   





(3) 35 weeks gestation of pregnancy


Current Visit: Yes   Status: Acute   Code(s): Z3A.35 - 35 WEEKS GESTATION OF 

PREGNANCY   SNOMED Code(s): 12658625


   





(4) S/P  section


Current Visit: Yes   Status: Acute   Code(s): Z98.891 - HISTORY OF UTERINE SCAR 

FROM PREVIOUS SURGERY   SNOMED Code(s): 869668105


   


Plan: 





Continue routine postpartum and postoperative care.  I have strongly encouraged 

the patient and we'll in the hallways routinely.  We have opted not to check 

blood sugars in the postpartum phase though she will likely need to be screened 

for diabetes at 6 weeks postpartum or shortly thereafter.

## 2021-12-12 NOTE — P.PN
Progress Note - Text


Progress Note Date: 21 (197)





Anesthesia


Postop  day 1


Subjective: Status Post section with Duramorph.


 Patient seen and examined.  Doing well without complaint.  VAS 0. No nausea 

vomiting or pruritis .  Afebrile.  Gross lower extremity strength intact.  

Spinal site intact without induration.  Without apparent anesthetic 

complications.





Objective: Vital signs reviewed


Heart: Regular Rate


Lungs: Good chest excursion


Abdomen: Appears nondistended





Assessment: Status post  with Duramorph postop day 1


Plan: Continue current care with your medical management.

## 2021-12-13 LAB
GLUCOSE UR QL: (no result)
PH UR: 6 [PH] (ref 5–8)
PROT UR QL: (no result)
RBC UR QL: >182 /HPF (ref 0–5)
SP GR UR: 1.04 (ref 1–1.03)
SQUAMOUS UR QL AUTO: 13 /HPF (ref 0–4)
UROBILINOGEN UR QL STRIP: <2 MG/DL (ref ?–2)
WBC #/AREA URNS HPF: 82 /HPF (ref 0–5)

## 2021-12-13 RX ADMIN — ACETAMINOPHEN SCH: 500 TABLET ORAL at 09:15

## 2021-12-13 RX ADMIN — DOCUSATE SODIUM AND SENNOSIDES SCH EACH: 50; 8.6 TABLET ORAL at 09:18

## 2021-12-13 RX ADMIN — ACETAMINOPHEN SCH MG: 500 TABLET ORAL at 14:54

## 2021-12-13 RX ADMIN — IBUPROFEN SCH MG: 600 TABLET ORAL at 17:43

## 2021-12-13 RX ADMIN — ACETAMINOPHEN SCH: 500 TABLET ORAL at 03:27

## 2021-12-13 RX ADMIN — IBUPROFEN SCH MG: 600 TABLET ORAL at 04:57

## 2021-12-13 RX ADMIN — ACETAMINOPHEN SCH MG: 500 TABLET ORAL at 23:27

## 2021-12-13 RX ADMIN — IBUPROFEN SCH MG: 600 TABLET ORAL at 11:03

## 2021-12-13 NOTE — P.PNOBGPC
Subjective





- Subjective


Principal diagnosis: POD 2 LTCS


Interval history: 





Patient is doing well, no concerns or complaints today.  lochia is minimal, pain

is well controlled. 


she is ambulating and voiding without difficulty.   


Patient reports: Reports appetite normal, Reports voiding normally, Reports pain

well controlled, Reports ambulating normally


: doing well (in the nursery)





Objective





- Vital Signs


Latest vital signs: 


                                   Vital Signs











  Temp Pulse Resp BP Pulse Ox


 


 21 09:02  97.7 F  100  13  144/79  98


 


 21 23:58  98.0 F  68  16  124/73  98


 


 21 23:56    16  


 


 21 16:00  98.3 F  92  16  119/75  97








                                Intake and Output











 21





 06:59 14:59 22:59


 


Intake Total  500 


 


Balance  500 


 


Intake:   


 


  Oral  500 


 


Other:   


 


  Voiding Method Indwelling Catheter Toilet 


 


  # Voids 2 1 














- Exam


Extremities: Present: edema


Abdomen: Present: normal appearance


Incision: Present: normal, intact


Uterus: Present: firm





Assessment and Plan


(1) 35 weeks gestation of pregnancy


Current Visit: Yes   Status: Acute   Code(s): Z3A.35 - 35 WEEKS GESTATION OF 

PREGNANCY   SNOMED Code(s): 71850218


   





(2) Gestational diabetes


Current Visit: Yes   Status: Acute   Code(s): O24.419 - GESTATIONAL DIABETES 

MELLITUS IN PREGNANCY, UNSP CONTROL   SNOMED Code(s): 28798305


   





(3) Large for gestational age fetus


Current Visit: Yes   Status: Acute   Code(s): CXM6817 -    SNOMED Code(s): 

196460951


   





(4) Obesity


Current Visit: Yes   Status: Acute   Code(s): E66.9 - OBESITY, UNSPECIFIED   

SNOMED Code(s): 977514057


   





(5)  labor


Current Visit: Yes   Status: Acute   Code(s): O60.00 -  LABOR WITHOUT 

DELIVERY, UNSPECIFIED TRIMESTER   SNOMED Code(s): 8414506


   





(6) S/P  section


Current Visit: Yes   Status: Acute   Code(s): Z98.891 - HISTORY OF UTERINE SCAR 

FROM PREVIOUS SURGERY   SNOMED Code(s): 733307413


   


Plan: 





patient is doing well, plan to continue routine postoperative care.

## 2021-12-14 RX ADMIN — IBUPROFEN SCH MG: 600 TABLET ORAL at 17:15

## 2021-12-14 RX ADMIN — IBUPROFEN SCH: 600 TABLET ORAL at 04:39

## 2021-12-14 RX ADMIN — DOCUSATE SODIUM AND SENNOSIDES SCH: 50; 8.6 TABLET ORAL at 08:45

## 2021-12-14 RX ADMIN — IBUPROFEN SCH MG: 600 TABLET ORAL at 10:55

## 2021-12-14 RX ADMIN — IBUPROFEN SCH MG: 600 TABLET ORAL at 23:06

## 2021-12-14 RX ADMIN — ACETAMINOPHEN SCH MG: 500 TABLET ORAL at 13:51

## 2021-12-14 RX ADMIN — ACETAMINOPHEN SCH MG: 500 TABLET ORAL at 08:47

## 2021-12-14 RX ADMIN — DOCUSATE SODIUM AND SENNOSIDES SCH: 50; 8.6 TABLET ORAL at 20:10

## 2021-12-14 RX ADMIN — ACETAMINOPHEN SCH MG: 500 TABLET ORAL at 20:10

## 2021-12-14 RX ADMIN — IBUPROFEN SCH MG: 600 TABLET ORAL at 04:53

## 2021-12-14 RX ADMIN — ACETAMINOPHEN SCH: 500 TABLET ORAL at 04:40

## 2021-12-14 NOTE — P.PNOBGPC
Subjective





- Subjective


Principal diagnosis: POD 3 LTCS


Interval history: 





patient is doing well postoperatively.  She is a billing and voiding without 

difficulty.  She is tolerating a regular diet without nausea or vomiting.  She 

states her pain is well-controlled.  Lochia is minimal.


Patient reports: Reports appetite normal, Reports voiding normally, Reports pain

well controlled, Reports ambulating normally


: doing well (in the nursery)





Objective





- Vital Signs


Latest vital signs: 


                                   Vital Signs











  Temp Pulse Resp BP Pulse Ox


 


 21 14:50  98.3 F  94  16  135/85  98


 


 21 07:00  97.8 F  95  18  122/81  97


 


 21 23:57  98.6 F  89  16  123/80  99








                                Intake and Output











 21





 06:59 14:59 22:59


 


Other:   


 


  # Voids 1 2 














- Exam


Extremities: Present: normal, edema


Abdomen: Present: normal appearance, soft


Incision: Present: normal, dry


Uterus: Present: normal, firm





- Labs


Labs: 


                  Abnormal Lab Results - Last 24 Hours (Table)











  21 Range/Units





  19:40 


 


Urine Appearance  Cloudy H  (Clear)  


 


Ur Specific Gravity  1.036 H  (1.001-1.035)  


 


Urine Protein  2+ H  (Negative)  


 


Urine Glucose (UA)  2+ H  (Negative)  


 


Urine Ketones  Trace H  (Negative)  


 


Urine Blood  Large H  (Negative)  


 


Ur Leukocyte Esterase  Moderate H  (Negative)  


 


Urine RBC  >182 H  (0-5)  /hpf


 


Urine WBC  82 H  (0-5)  /hpf


 


Ur Squamous Epith Cells  13 H  (0-4)  /hpf


 


Urine Mucus  Few H  (None)  /hpf














Assessment and Plan


(1) 35 weeks gestation of pregnancy


Current Visit: Yes   Status: Acute   Code(s): Z3A.35 - 35 WEEKS GESTATION OF 

PREGNANCY   SNOMED Code(s): 61273371


   





(2) Gestational diabetes


Current Visit: Yes   Status: Acute   Code(s): O24.419 - GESTATIONAL DIABETES 

MELLITUS IN PREGNANCY, UNSP CONTROL   SNOMED Code(s): 84204528


   





(3) Large for gestational age fetus


Current Visit: Yes   Status: Acute   Code(s): QHE7215 -    SNOMED Code(s): 

354777424


   





(4) Obesity


Current Visit: Yes   Status: Acute   Code(s): E66.9 - OBESITY, UNSPECIFIED   

SNOMED Code(s): 967533928


   





(5)  labor


Current Visit: Yes   Status: Acute   Code(s): O60.00 -  LABOR WITHOUT 

DELIVERY, UNSPECIFIED TRIMESTER   SNOMED Code(s): 9040804


   





(6) S/P  section


Current Visit: Yes   Status: Acute   Code(s): Z98.891 - HISTORY OF UTERINE SCAR 

FROM PREVIOUS SURGERY   SNOMED Code(s): 028401549


   


Plan: 





patient continues to do well postoperatively, anticipate discharge home 

tomorrow.

## 2021-12-15 VITALS
RESPIRATION RATE: 16 BRPM | TEMPERATURE: 98.4 F | SYSTOLIC BLOOD PRESSURE: 126 MMHG | HEART RATE: 81 BPM | DIASTOLIC BLOOD PRESSURE: 82 MMHG

## 2021-12-15 RX ADMIN — ACETAMINOPHEN SCH MG: 500 TABLET ORAL at 02:17

## 2021-12-15 RX ADMIN — IBUPROFEN SCH MG: 600 TABLET ORAL at 05:06

## 2021-12-15 RX ADMIN — DOCUSATE SODIUM AND SENNOSIDES SCH: 50; 8.6 TABLET ORAL at 10:04

## 2021-12-15 NOTE — P.DS
Providers


Date of admission: 


12/10/21 20:52





Expected date of discharge: 12/15/21


Attending physician: 


Sonia Fan





Primary care physician: 


Stated None








- Discharge Diagnosis(es)


(1) 35 weeks gestation of pregnancy


Current Visit: Yes   Status: Acute   





(2) Gestational diabetes


Current Visit: Yes   Status: Acute   





(3) Large for gestational age fetus


Current Visit: Yes   Status: Acute   





(4) Obesity


Current Visit: Yes   Status: Acute   





(5)  labor


Current Visit: Yes   Status: Acute   





(6) S/P  section


Current Visit: Yes   Status: Acute   


Hospital Course: 


This is a 21-year-old  1 para 0 at 35 and one sevenths weeks, that 

presented to labor and delivery with complaints of contractions.   Patient had a

recent diagnosis of gestational diabetes with a significantly abnormal 3 hour 

gtt.  Patient had yet to see diabetes education prior to the visit.  For full 

details on this patient please see the dictated history and physical Patient was

initially started on magnesium for total lysis, patient subsequently continued 

to contract noted to be in active labor, given large for gestational age noted 

on ultrasound patient had been counseled previously on primary  

secondary to fetal weight of almost 4000 g.  Patient elected primary . 

Patient underwent primary  without difficulty, for full details on the 

 please see the operative report.  Patient delivered a viable female 

infant weight of 8 lbs. 5 oz., Apgars of 8 and 9 at one and 5 minutes 

respectively.  Patient has done well post operatively.  On this postoperative 

day #4 she is ambulating and voiding without difficulty.  She is tolerating a 

regular diet without nausea or vomiting.  She states her pain is well-

controlled.


Infant remains in the nursery for feeding issues.





Patient Condition at Discharge: Good





Plan - Discharge Summary


New Discharge Prescriptions: 


No Action


   Pnv No.95/Ferrous Fum/Folic AC [Prenatal Multivitamin Tablet] 1 tablet PO DA

RAJESH


   Famotidine [Pepcid] 10 mg PO AS DIRECTED


Discharge Medication List





Pnv No.95/Ferrous Fum/Folic AC [Prenatal Multivitamin Tablet] 1 tablet PO DAILY 

09/10/21 [History]


Famotidine [Pepcid] 10 mg PO AS DIRECTED 21 [History]








Follow up Appointment(s)/Referral(s): 


Sonia Fan DO [Doctor of Osteopathic Medicine] - 2 Weeks


Patient Instructions/Handouts:   (DC),  (GEN)


Discharge Disposition: HOME SELF-CARE